# Patient Record
Sex: FEMALE | Race: WHITE | NOT HISPANIC OR LATINO
[De-identification: names, ages, dates, MRNs, and addresses within clinical notes are randomized per-mention and may not be internally consistent; named-entity substitution may affect disease eponyms.]

---

## 2021-06-15 ENCOUNTER — TRANSCRIPTION ENCOUNTER (OUTPATIENT)
Age: 76
End: 2021-06-15

## 2021-07-01 PROBLEM — Z00.00 ENCOUNTER FOR PREVENTIVE HEALTH EXAMINATION: Status: ACTIVE | Noted: 2021-07-01

## 2021-07-06 ENCOUNTER — APPOINTMENT (OUTPATIENT)
Dept: NEUROLOGY | Facility: CLINIC | Age: 76
End: 2021-07-06
Payer: MEDICARE

## 2021-07-06 VITALS
WEIGHT: 140 LBS | SYSTOLIC BLOOD PRESSURE: 131 MMHG | TEMPERATURE: 97.2 F | HEART RATE: 60 BPM | DIASTOLIC BLOOD PRESSURE: 79 MMHG | BODY MASS INDEX: 25.76 KG/M2 | HEIGHT: 62 IN

## 2021-07-06 PROCEDURE — 99203 OFFICE O/P NEW LOW 30 MIN: CPT

## 2021-07-07 RX ORDER — AMLODIPINE BESYLATE 5 MG/1
5 TABLET ORAL
Refills: 0 | Status: ACTIVE | COMMUNITY

## 2021-07-07 RX ORDER — LEVOTHYROXINE SODIUM 0.1 MG/1
100 TABLET ORAL
Qty: 90 | Refills: 0 | Status: ACTIVE | COMMUNITY
Start: 2021-06-07

## 2021-07-07 RX ORDER — METOPROLOL SUCCINATE 25 MG/1
25 TABLET, EXTENDED RELEASE ORAL
Qty: 90 | Refills: 0 | Status: ACTIVE | COMMUNITY
Start: 2021-06-07

## 2021-07-07 RX ORDER — NAPROXEN SODIUM 550 MG/1
550 TABLET ORAL
Qty: 20 | Refills: 0 | Status: DISCONTINUED | COMMUNITY
Start: 2021-06-15

## 2021-07-07 RX ORDER — METOPROLOL SUCCINATE 25 MG/1
25 TABLET, EXTENDED RELEASE ORAL
Refills: 0 | Status: DISCONTINUED | COMMUNITY

## 2021-07-07 RX ORDER — LEVOTHYROXINE SODIUM 100 UG/1
100 TABLET ORAL
Refills: 0 | Status: DISCONTINUED | COMMUNITY

## 2021-07-07 RX ORDER — ATORVASTATIN CALCIUM 10 MG/1
10 TABLET, FILM COATED ORAL
Refills: 0 | Status: ACTIVE | COMMUNITY

## 2021-07-07 RX ORDER — DEXAMETHASONE 4 MG/1
4 TABLET ORAL
Qty: 6 | Refills: 0 | Status: DISCONTINUED | COMMUNITY
Start: 2021-06-15

## 2021-07-07 RX ORDER — HYDROCHLOROTHIAZIDE 25 MG/1
25 TABLET ORAL
Refills: 0 | Status: ACTIVE | COMMUNITY

## 2021-07-07 RX ORDER — AMOXICILLIN 500 MG/1
500 TABLET, FILM COATED ORAL
Qty: 15 | Refills: 0 | Status: DISCONTINUED | COMMUNITY
Start: 2021-06-15

## 2021-07-08 NOTE — HISTORY OF PRESENT ILLNESS
[FreeTextEntry1] : Evelyne Henley is a 76 year old right handed woman with a history of quadruple bypass surgery, breast cancer 2009, presenting for a consultation for tingling in her hands. \par \par She endorses tingling and numbness in the first three digits right greater than left hand for many years. Denies pain. She bought splints which help the discomfort. Ms. Henley has not seen occupational therapy. She notes the tingling occurs when taking a shower and washing her hair, driving, or combing her hair in the morning. Denies neck pain radiating down the arm. Ms. Henley has difficulty opening jars due to the numbness in the hands. \par She teaches dance.\par \par Ms. Henley has chronic back pain radiating down the right leg that has improved. She had acupuncture and physical therapy that helped the symptoms. She reports having previous MRIs.\par \par The remaining neurological review of systems is negative.

## 2021-07-08 NOTE — PHYSICAL EXAM
[FreeTextEntry1] : Physical examination \par General: No acute distress, Awake, Alert.   \par  \par \par Mental status \par Awake, alert, gives detailed history.\par  \par Cranial Nerves \par II: VFF  \par III, IV, VI: PERRL, EOMI.   \par V: Facial sensation is normal B/L.   \par VII: Facial strength is normal B/L. \par \par \par VIII: Gross hearing is intact.   \par  \par \par IX, X: Palate is midline and elevates symmetrically.   \par XI: Trapezius normal strength.   \par XII: Tongue midline without atrophy or fasciculations. \par \par Motor exam  \par Muscle tone - no evidence of rigidity or resistance in all 4 extremities.  \par No atrophy or fasciculations \par Muscle Strength: arms and legs, proximal and distal flexors and extensors are normal \par \par No UE drift.\par \par Reflexes \par All present, normal, and symmetrical.   \par \par  \par Plantars right: mute.   \par Plantars left: mute.   \par  \par \par Coordination \par Finger to nose: Normal.  \par Heel to shin: Normal.   \par  \par \par Sensory \par Intact sensation to vibration.\par Minimal decreased cold right thumb. \par Decreased PP sparing pinky B.\par +Tinel's B.\par +Phalen's right. \par \par \par Gait \par Normal including heels, toes, and tandem gait.  \par  \par \par

## 2021-07-08 NOTE — ASSESSMENT
[FreeTextEntry1] : Evelyne Henley is a 76 year old woman with probable bilateral carpal tunnel syndrome. \par \par OT evaluation. \par Continue splints at night.\par EMG arms. \par \par Follow up in 3 months. \par \par I discussed in detail with the patient the diagnosis, prognosis, treatment plan and answered all of her questions.\par \par

## 2021-07-08 NOTE — REVIEW OF SYSTEMS
[Numbness] : numbness [Negative] : Heme/Lymph [de-identified] : dry skin [de-identified] : cold intolerance

## 2021-07-08 NOTE — CONSULT LETTER
[Dear  ___] : Dear  [unfilled], [FreeTextEntry1] : I had the pleasure of evaluating your patient, JCARLOS FERNÁNDEZ. Please see the assessment section below for a summary of my diagnostic impression and plan.\par \par Thank you very much for allowing me to participate in the care of this patient. If you have any questions, please do not hesitate to contact me. \par \par Sincerely,\par \par Sherie Owen MD\par Coco Long N.P.\par

## 2021-07-08 NOTE — REASON FOR VISIT
[Consultation] : a consultation visit [FreeTextEntry1] : Tingling on both arms started many years ago

## 2021-07-27 ENCOUNTER — APPOINTMENT (OUTPATIENT)
Dept: NEUROLOGY | Facility: CLINIC | Age: 76
End: 2021-07-27

## 2021-09-29 ENCOUNTER — NON-APPOINTMENT (OUTPATIENT)
Age: 76
End: 2021-09-29

## 2021-09-29 ENCOUNTER — APPOINTMENT (OUTPATIENT)
Dept: NEUROLOGY | Facility: CLINIC | Age: 76
End: 2021-09-29
Payer: MEDICARE

## 2021-09-29 PROCEDURE — 95886 MUSC TEST DONE W/N TEST COMP: CPT

## 2021-09-29 PROCEDURE — 95913 NRV CNDJ TEST 13/> STUDIES: CPT

## 2021-10-07 ENCOUNTER — APPOINTMENT (OUTPATIENT)
Dept: NEUROLOGY | Facility: CLINIC | Age: 76
End: 2021-10-07
Payer: MEDICARE

## 2021-10-07 VITALS
SYSTOLIC BLOOD PRESSURE: 145 MMHG | HEART RATE: 51 BPM | DIASTOLIC BLOOD PRESSURE: 67 MMHG | WEIGHT: 140 LBS | HEIGHT: 62 IN | TEMPERATURE: 97.3 F | BODY MASS INDEX: 25.76 KG/M2

## 2021-10-07 DIAGNOSIS — G56.00 CARPAL TUNNEL SYNDROME, UNSPECIFIED UPPER LIMB: ICD-10-CM

## 2021-10-07 PROCEDURE — 99213 OFFICE O/P EST LOW 20 MIN: CPT

## 2021-10-07 NOTE — PHYSICAL EXAM
[FreeTextEntry1] : Physical examination \par General: No acute distress, Awake, Alert.   \par  other: small ecchymotic area biceps B. \par \par Mental status \par Awake, alert, gives detailed history.\par  \par Cranial Nerves \par II: VFF  \par III, IV, VI: PERRL, EOMI.   \par V: Facial sensation is normal B/L.   \par VII: Facial strength is normal B/L. \par \par \par VIII: Gross hearing is intact.   \par  \par \par IX, X: Palate is midline and elevates symmetrically.   \par XI: Trapezius normal strength.   \par XII: Tongue midline without atrophy or fasciculations. \par \par Motor exam  \par Muscle tone - no evidence of rigidity or resistance in all 4 extremities.  \par No atrophy or fasciculations \par Muscle Strength: arms and legs, proximal and distal flexors and extensors are normal \par \par No UE drift.\par \par  \par  \par \par Coordination \par Finger to nose: Normal.  \par Heel to shin: Normal.   \par  \par \par Sensory \par Intact sensation to vibration.\par Minimal decreased cold right thumb. \par Decreased PP in fingers sparing pinky B.\par  \par \par \par Gait \par Normal including heels, toes, and tandem gait.  \par  \par \par

## 2021-10-07 NOTE — HISTORY OF PRESENT ILLNESS
[FreeTextEntry1] : Evelyne Henley is a 76 year old right handed woman with a history of quadruple bypass surgery, breast cancer 2009, presenting for a follow up for tingling in her hands. \par \par She had difficulty finding OT near her home but is wearing splints nightly with some relief in her right hand. She reports her right hand is unchanged from previous visit and has tingling and numbness in the first three digits. The tingling is worse when driving or washing her hair. Denies any weakness except difficulty with fine motor movements.\par \par The remaining neurological review of systems is negative. \par \par 7/6/21\par Evelyne Henley is a 76 year old right handed woman with a history of quadruple bypass surgery, breast cancer 2009, presenting for a consultation for tingling in her hands. \par \par She endorses tingling and numbness in the first three digits right greater than left hand for many years. Denies pain. She bought splints which help the discomfort. Ms. Henley has not seen occupational therapy. She notes the tingling occurs when taking a shower and washing her hair, driving, or combing her hair in the morning. Denies neck pain radiating down the arm. Ms. Henley has difficulty opening jars due to the numbness in the hands. \par She teaches dance.\par \par Ms. Henley has chronic back pain radiating down the right leg that has improved. She had acupuncture and physical therapy that helped the symptoms. She reports having previous MRIs.\par \par The remaining neurological review of systems is negative.

## 2021-10-07 NOTE — ASSESSMENT
[FreeTextEntry1] : Evelyne Henley is a 76 year old woman with  bilateral carpal tunnel syndrome, severe on right and mild on left. \par \par OT evaluation. \par Continue splints at night.\par EMG arms reviewed with patient. \par Orthopedic hand surgeon referral. \par \par Follow up PRN. \par \par Case discussed with Dr. Owen. \par \par I discussed in detail with the patient the diagnosis, prognosis, treatment plan and answered all of her questions.\par \par

## 2021-10-07 NOTE — DATA REVIEWED
[de-identified] : 9/29/21 EMG arms- There is electrophysiologic evidence of carpal tunnel syndrome; severe on the right and mild on the left. There is no electrophysiologic evidence of a cervical radiculopathy involving the motor axons.

## 2021-10-07 NOTE — REASON FOR VISIT
[Follow-Up: _____] : a [unfilled] follow-up visit [FreeTextEntry1] : EMG Results, pt is concerned about bruising.

## 2022-01-12 ENCOUNTER — TRANSCRIPTION ENCOUNTER (OUTPATIENT)
Age: 77
End: 2022-01-12

## 2022-07-29 ENCOUNTER — APPOINTMENT (OUTPATIENT)
Dept: AFTER HOURS CARE | Facility: EMERGENCY ROOM | Age: 77
End: 2022-07-29

## 2022-07-29 DIAGNOSIS — U07.1 COVID-19: ICD-10-CM

## 2022-07-29 PROCEDURE — 99204 OFFICE O/P NEW MOD 45 MIN: CPT | Mod: CS,95

## 2022-07-29 RX ORDER — MOLNUPIRAVIR 200 MG/1
200 CAPSULE ORAL TWICE DAILY
Qty: 40 | Refills: 0 | Status: ACTIVE | COMMUNITY
Start: 2022-07-29 | End: 1900-01-01

## 2022-07-29 NOTE — HISTORY OF PRESENT ILLNESS
[Home] : at home, [unfilled] , at the time of the visit. [Other Location: e.g. Home (Enter Location, City,State)___] : at [unfilled] [Verbal consent obtained from patient] : the patient, [unfilled] [FreeTextEntry8] : 77y F hx CAD s/p 4vCABG 20yrs ago, breast ca stage 1 >10d ago, hld, hypothyroid, HTN, now tested positive for COVID today after having 1-2d rhinorrhea, ear aches, sore throat, fatigue. Has been taking cold-ease to moderate relief. No SOB. No fever. BP normal. Vaccinated with 2 boosters. \par Meds: lipitor, synthroid, amlodipine, metoprolol ER, HCTZ, ASA

## 2022-07-29 NOTE — PLAN
[With new medications prescribed] : Treat in place: with new medications prescribed [FreeTextEntry1] : rx molnupiravir\par Pulse-ox\par symptomatic care - apap, nsaids, sudafed, etc\par anticipatory guidance incl quarantine\par indications to seek ED care discussed, incl SOB, SpO2< 94, dehydration, AMS\par prompt PMD follow up

## 2022-12-23 ENCOUNTER — NON-APPOINTMENT (OUTPATIENT)
Age: 77
End: 2022-12-23